# Patient Record
Sex: FEMALE | Race: WHITE | NOT HISPANIC OR LATINO | Employment: FULL TIME | URBAN - METROPOLITAN AREA
[De-identification: names, ages, dates, MRNs, and addresses within clinical notes are randomized per-mention and may not be internally consistent; named-entity substitution may affect disease eponyms.]

---

## 2022-09-08 ENCOUNTER — TELEMEDICINE (OUTPATIENT)
Dept: FAMILY MEDICINE CLINIC | Facility: TELEHEALTH | Age: 37
End: 2022-09-08

## 2022-09-08 DIAGNOSIS — L23.7 POISON IVY DERMATITIS: Primary | ICD-10-CM

## 2022-09-08 PROCEDURE — 99203 OFFICE O/P NEW LOW 30 MIN: CPT | Performed by: NURSE PRACTITIONER

## 2022-09-08 RX ORDER — PREDNISONE 20 MG/1
TABLET ORAL
Qty: 24 TABLET | Refills: 0 | Status: SHIPPED | OUTPATIENT
Start: 2022-09-08 | End: 2022-09-20

## 2022-09-08 NOTE — PATIENT INSTRUCTIONS
Avoid using the steroid cream on the face or genitals.   Use antihistamine of choice (Benadryl, Claritin, Zyrtec etc.) as needed for itching.   Wash clothing, bedding or pets that my have been exposed.     If symptoms worsen or do not improve follow up with your PCP or visit your nearest Urgent Care Center or ER.

## 2022-09-08 NOTE — PROGRESS NOTES
Subjective    Chief Complaint   Patient presents with   • Rash       Domi Lee is a 37 y.o. female.     Pt reports cleaning a fence line 3 days ago. The next morning she developed a pruritic rash on both arms from the elbow down. Rash has started to blister and burns. She thinks she was exposed to poison ivy but is not sure due to the blistering.      Rash  This is a new problem. Episode onset: 2 days. The affected locations include the left arm and right arm. The rash is characterized by blistering, redness, itchiness and burning. She was exposed to plant contact. Pertinent negatives include no anorexia, congestion, cough, diarrhea, eye pain, facial edema, fatigue, fever, joint pain, nail changes, rhinorrhea, shortness of breath, sore throat or vomiting. Treatments tried: calamine.        No Known Allergies    No past medical history on file.    No past surgical history on file.    Social History     Socioeconomic History   • Marital status: Single       No family history on file.      Current Outpatient Medications:   •  predniSONE (DELTASONE) 20 MG tablet, Take 3 tablets by mouth Daily for 4 days, THEN 2 tablets Daily for 4 days, THEN 1 tablet Daily for 4 days., Disp: 24 tablet, Rfl: 0  •  triamcinolone (KENALOG) 0.1 % ointment, Apply 1 application topically to the appropriate area as directed 2 (Two) Times a Day., Disp: 80 g, Rfl: 0      Review of Systems   Constitutional: Negative for chills, diaphoresis, fatigue and fever.   HENT: Negative for congestion, facial swelling, rhinorrhea and sore throat.    Eyes: Negative for pain.   Respiratory: Negative for cough, chest tightness, shortness of breath and wheezing.    Gastrointestinal: Negative for anorexia, diarrhea and vomiting.   Musculoskeletal: Negative for joint pain.   Skin: Positive for rash. Negative for nail changes.        There were no vitals filed for this visit.    Objective   Physical Exam  Constitutional:       General: She is not in  acute distress.     Appearance: Normal appearance. She is not ill-appearing, toxic-appearing or diaphoretic.   HENT:      Head: Normocephalic.      Mouth/Throat:      Lips: Pink.      Mouth: Mucous membranes are moist.   Pulmonary:      Effort: Pulmonary effort is normal.   Skin:     Findings: Erythema and rash present. Rash is vesicular.             Comments: Rash on bilat lower arms with liner abrasions, erythema and some yellow blisters, resembles poison ivy.    Neurological:      Mental Status: She is alert and oriented to person, place, and time.   Psychiatric:         Mood and Affect: Mood normal.         Behavior: Behavior normal.          Procedures     Assessment & Plan   Diagnoses and all orders for this visit:    1. Poison ivy dermatitis (Primary)  -     predniSONE (DELTASONE) 20 MG tablet; Take 3 tablets by mouth Daily for 4 days, THEN 2 tablets Daily for 4 days, THEN 1 tablet Daily for 4 days.  Dispense: 24 tablet; Refill: 0  -     triamcinolone (KENALOG) 0.1 % ointment; Apply 1 application topically to the appropriate area as directed 2 (Two) Times a Day.  Dispense: 80 g; Refill: 0    Avoid using the steroid cream on the face or genitals.   Use antihistamine of choice (Benadryl, Claritin, Zyrtec etc.) as needed for itching.   Wash clothing, bedding or pets that my have been exposed.     If symptoms worsen or do not improve follow up with your PCP or visit your nearest Urgent Care Center or ER.    No results found for this or any previous visit.    PLAN: Discussed dosing, side effects, recommended other symptomatic care.  Patient should follow up with primary care provider, Urgent Care or ER if symptoms worsen, fail to resolve or other symptoms need attention. Patient/family agree to the above.         AIDEE Andres     The use of a video visit has been reviewed with the patient and verbal informed consent has been obtained. Myself and Domi Lee participated in this visit. The patient  is located at [address] [place] [address]   Formerly [facility] [id number]. I am located in [place], [place]. [employer] and [employer] were utilized.        This visit was performed via Telehealth.  This patient has been instructed to follow-up with their primary care provider if their symptoms worsen or the treatment provided does not resolve their illness.